# Patient Record
Sex: MALE | Race: WHITE | Employment: OTHER | URBAN - METROPOLITAN AREA
[De-identification: names, ages, dates, MRNs, and addresses within clinical notes are randomized per-mention and may not be internally consistent; named-entity substitution may affect disease eponyms.]

---

## 2022-07-25 ENCOUNTER — OFFICE VISIT (OUTPATIENT)
Dept: UROLOGY | Age: 72
End: 2022-07-25
Payer: MEDICARE

## 2022-07-25 DIAGNOSIS — N32.81 OVERACTIVE BLADDER: ICD-10-CM

## 2022-07-25 DIAGNOSIS — C61 MALIGNANT NEOPLASM PROSTATE (HCC): Primary | ICD-10-CM

## 2022-07-25 LAB
BILIRUBIN, URINE, POC: NEGATIVE
BLOOD URINE, POC: NEGATIVE
GLUCOSE URINE, POC: NEGATIVE
KETONES, URINE, POC: NEGATIVE
LEUKOCYTE ESTERASE, URINE, POC: NEGATIVE
NITRITE, URINE, POC: NEGATIVE
PH, URINE, POC: 5.5 (ref 4.6–8)
PROTEIN,URINE, POC: NEGATIVE
SPECIFIC GRAVITY, URINE, POC: 1.02 (ref 1–1.03)
URINALYSIS CLARITY, POC: NORMAL
URINALYSIS COLOR, POC: NORMAL
UROBILINOGEN, POC: NORMAL

## 2022-07-25 PROCEDURE — 1123F ACP DISCUSS/DSCN MKR DOCD: CPT | Performed by: UROLOGY

## 2022-07-25 PROCEDURE — 99204 OFFICE O/P NEW MOD 45 MIN: CPT | Performed by: UROLOGY

## 2022-07-25 PROCEDURE — 81003 URINALYSIS AUTO W/O SCOPE: CPT | Performed by: UROLOGY

## 2022-07-25 RX ORDER — VIBEGRON 75 MG/1
1 TABLET, FILM COATED ORAL DAILY
Qty: 90 TABLET | Refills: 3 | Status: SHIPPED | OUTPATIENT
Start: 2022-07-25

## 2022-07-25 ASSESSMENT — ENCOUNTER SYMPTOMS
SKIN LESIONS: 0
VOMITING: 0
WHEEZING: 0
BLOOD IN STOOL: 0
BACK PAIN: 0
NAUSEA: 0
COUGH: 0
CONSTIPATION: 0
DIARRHEA: 0
HEARTBURN: 0
EYE DISCHARGE: 0
INDIGESTION: 0
ABDOMINAL PAIN: 0
SHORTNESS OF BREATH: 0
EYE PAIN: 0

## 2022-07-25 NOTE — PROGRESS NOTES
[facility]  [address] [facility]    [address], [address] [facility]  [phone]          Merlinda Garner  : 1950    Chief Complaint   Patient presents with    New Patient    Prostate Cancer          HPI     Merlinda Garner is a 74 y.o. male ([id number]) seen as a new patient in . He has a h/o 4+3=7 ACP s/p EBRT in  in [facility]. He has had issues with urinary frequency/urgency since. Gemtesa 4-7 pills per week helps significantly. He also underwent TURP in . He had signficant bleeding and was in ICU for 2 nights (also [facility]). PSA: 3/19 11.5,  0.24          History reviewed. No pertinent past medical history. History reviewed. No pertinent surgical history. Current Outpatient Medications   Medication Sig Dispense Refill    Vibegron (GEMTESA) 75 MG TABS Take 1 tablet by mouth daily 90 tablet 3     No current facility-administered medications for this visit. Not on File  Social History     Socioeconomic History    Marital status: Single     Spouse name: Not on file    Number of children: Not on file    Years of education: Not on file    Highest education level: Not on file   Occupational History    Not on file   Tobacco Use    Smoking status: Not on file    Smokeless tobacco: Not on file   Substance and Sexual Activity    Alcohol use: Not on file    Drug use: Not on file    Sexual activity: Not on file   Other Topics Concern    Not on file   Social History Narrative    Not on file     Social Determinants of Health     Financial Resource Strain: Not on file   Food Insecurity: Not on file   Transportation Needs: Not on file   Physical Activity: Not on file   Stress: Not on file   Social Connections: Not on file   Intimate Partner Violence: Not on file   Housing Stability: Not on file     History reviewed. No pertinent family history.     Review of Systems  Constitutional:   Negative for fever, chills, appetite change, malaise/fatigue, headaches and weight loss.  Skin:  Negative for skin lesions, rash and itching. Eyes:  Negative for visual disturbance, eye pain and eye discharge. ENT:  Negative for difficulty articulating words, pain swallowing, high frequency hearing loss and dry mouth. Respiratory:  Negative for cough, blood in sputum, shortness of breath and wheezing. Cardiovascular:  Negative for chest pain, hypertension, irregular heartbeat, leg pain, leg swelling, regular rate and rhythm and varicose veins. GI:  Negative for nausea, vomiting, abdominal pain, blood in stool, constipation, diarrhea, indigestion and heartburn. Genitourinary: Positive for nocturia, slower stream, straining, urgency, leakage w/ urge, frequent urination and incomplete emptying. Negative for urinary burning, hematuria, flank pain, recurrent UTIs, history of urolithiasis, erectile dysfunction, testicular pain, sexually transmitted disease, discharge and urethral stricture. Musculoskeletal:  Negative for back pain, bone pain, arthralgias, tenderness, muscle weakness and neck pain. Neurological:  Negative for dizziness, focal weakness, numbness, seizures and tremors. Psychological:  Negative for depression and psychiatric problem. Endocrine:  Negative for cold intolerance, thirst, excessive urination, fatigue and heat intolerance. Hem/Lymphatic:  Negative for easy bleeding, easy bruising and frequent infections.     Urinalysis  UA - Dipstick  Results for orders placed or performed in visit on 07/25/22   AMB POC URINALYSIS DIP STICK AUTO W/O MICRO   Result Value Ref Range    Color (UA POC)      Clarity (UA POC)      Glucose, Urine, POC Negative Negative    Bilirubin, Urine, POC Negative Negative    KETONES, Urine, POC Negative Negative    Specific Gravity, Urine, POC 1.025 1.001 - 1.035    Blood (UA POC) Negative Negative    pH, Urine, POC 5.5 4.6 - 8.0    Protein, Urine, POC Negative Negative    Urobilinogen, POC 0.2 mg/dL     Nitrite, Urine, POC Negative Negative    Leukocyte Esterase, Urine, POC Negative Negative       There were no vitals taken for this visit. GENERAL: NAD, ALERT, A&O x 3, GAIT NORMAL  LUNGS: easy work of breathing  ABDOMEN: soft, non tender  NEUROLOGIC: cranial nerves 2-12 grossly intact           Assessment and Plan    ICD-10-CM    1. Malignant neoplasm prostate (HCC)  C61 AMB POC URINALYSIS DIP STICK AUTO W/O MICRO     PSA, Diagnostic      2. Overactive bladder  N32.81 Vibegron (GEMTESA) 75 MG TABS          In regards to prostate cancer, PSA is being drawn later this week at [facility]. In regards to OAB, he will continue gemtesa and it was refilled. Pt. will follow up in 12 months with psa.

## 2023-07-26 ENCOUNTER — OFFICE VISIT (OUTPATIENT)
Dept: UROLOGY | Age: 73
End: 2023-07-26
Payer: MEDICARE

## 2023-07-26 DIAGNOSIS — N40.1 BPH WITH OBSTRUCTION/LOWER URINARY TRACT SYMPTOMS: ICD-10-CM

## 2023-07-26 DIAGNOSIS — N32.81 OVERACTIVE BLADDER: ICD-10-CM

## 2023-07-26 DIAGNOSIS — C61 MALIGNANT NEOPLASM PROSTATE (HCC): Primary | ICD-10-CM

## 2023-07-26 DIAGNOSIS — N13.8 BPH WITH OBSTRUCTION/LOWER URINARY TRACT SYMPTOMS: ICD-10-CM

## 2023-07-26 LAB
BILIRUBIN, URINE, POC: NEGATIVE
BLOOD URINE, POC: NEGATIVE
GLUCOSE URINE, POC: NEGATIVE
KETONES, URINE, POC: NEGATIVE
LEUKOCYTE ESTERASE, URINE, POC: NEGATIVE
NITRITE, URINE, POC: NEGATIVE
PH, URINE, POC: 6 (ref 4.6–8)
PROTEIN,URINE, POC: NEGATIVE
SPECIFIC GRAVITY, URINE, POC: 1.01 (ref 1–1.03)
URINALYSIS CLARITY, POC: NORMAL
URINALYSIS COLOR, POC: NORMAL
UROBILINOGEN, POC: NORMAL

## 2023-07-26 PROCEDURE — 99214 OFFICE O/P EST MOD 30 MIN: CPT | Performed by: UROLOGY

## 2023-07-26 PROCEDURE — 81003 URINALYSIS AUTO W/O SCOPE: CPT | Performed by: UROLOGY

## 2023-07-26 PROCEDURE — 1123F ACP DISCUSS/DSCN MKR DOCD: CPT | Performed by: UROLOGY

## 2023-07-26 ASSESSMENT — ENCOUNTER SYMPTOMS: BACK PAIN: 0

## 2023-07-26 NOTE — PROGRESS NOTES
history. Review of Systems  Constitutional:   Negative for fever. Genitourinary: Positive for straining and frequent urination. Negative for hematuria. Musculoskeletal:  Negative for back pain. Urinalysis  UA - Dipstick  Results for orders placed or performed in visit on 07/26/23   AMB POC URINALYSIS DIP STICK AUTO W/O MICRO   Result Value Ref Range    Color (UA POC)      Clarity (UA POC)      Glucose, Urine, POC Negative Negative    Bilirubin, Urine, POC Negative Negative    KETONES, Urine, POC Negative Negative    Specific Gravity, Urine, POC 1.010 1.001 - 1.035    Blood (UA POC) Negative Negative    pH, Urine, POC 6.0 4.6 - 8.0    Protein, Urine, POC Negative Negative    Urobilinogen, POC 0.2 mg/dL     Nitrite, Urine, POC Negative Negative    Leukocyte Esterase, Urine, POC Negative Negative       There were no vitals taken for this visit. GENERAL: NAD, ALERT, A&O x 3, GAIT NORMAL  LUNGS: easy work of breathing  ABDOMEN: soft, non tender  NEUROLOGIC: cranial nerves 2-12 grossly intact           Assessment and Plan    ICD-10-CM    1. Malignant neoplasm prostate (HCC)  C61 AMB POC URINALYSIS DIP STICK AUTO W/O MICRO      2. Overactive bladder  N32.81 AMB POC URINALYSIS DIP STICK AUTO W/O MICRO      3. BPH with obstruction/lower urinary tract symptoms  N40.1     N13.8           Due to worsening LUTS, he will return for cystoscopy. We could then make sure there is no BNC and see if something like a urolift might help. He will continue gemtesa. PSA is LOW. I have spent 30 minutes today reviewing previous notes, test results and face to face with the patient as well as documenting.

## 2023-09-20 ENCOUNTER — TELEPHONE (OUTPATIENT)
Dept: UROLOGY | Age: 73
End: 2023-09-20

## 2023-09-21 ENCOUNTER — PROCEDURE VISIT (OUTPATIENT)
Dept: UROLOGY | Age: 73
End: 2023-09-21
Payer: MEDICARE

## 2023-09-21 DIAGNOSIS — N32.81 OVERACTIVE BLADDER: Primary | ICD-10-CM

## 2023-09-21 DIAGNOSIS — N40.1 BPH WITH OBSTRUCTION/LOWER URINARY TRACT SYMPTOMS: ICD-10-CM

## 2023-09-21 DIAGNOSIS — N13.8 BPH WITH OBSTRUCTION/LOWER URINARY TRACT SYMPTOMS: ICD-10-CM

## 2023-09-21 DIAGNOSIS — C61 MALIGNANT NEOPLASM PROSTATE (HCC): ICD-10-CM

## 2023-09-21 LAB
BILIRUBIN, URINE, POC: NEGATIVE
BLOOD URINE, POC: NEGATIVE
GLUCOSE URINE, POC: NEGATIVE
KETONES, URINE, POC: NEGATIVE
LEUKOCYTE ESTERASE, URINE, POC: NEGATIVE
NITRITE, URINE, POC: NEGATIVE
PH, URINE, POC: 7 (ref 4.6–8)
PROTEIN,URINE, POC: NEGATIVE
SPECIFIC GRAVITY, URINE, POC: 1.01 (ref 1–1.03)
URINALYSIS CLARITY, POC: NORMAL
URINALYSIS COLOR, POC: NORMAL
UROBILINOGEN, POC: NORMAL

## 2023-09-21 PROCEDURE — 52000 CYSTOURETHROSCOPY: CPT | Performed by: UROLOGY

## 2023-09-21 PROCEDURE — 81003 URINALYSIS AUTO W/O SCOPE: CPT | Performed by: UROLOGY

## 2023-09-21 RX ORDER — LOSARTAN POTASSIUM 100 MG/1
TABLET ORAL
COMMUNITY
Start: 2023-07-23

## 2023-09-21 RX ORDER — METFORMIN HYDROCHLORIDE 500 MG/1
1000 TABLET, EXTENDED RELEASE ORAL 2 TIMES DAILY
COMMUNITY
Start: 2023-06-29

## 2023-09-21 RX ORDER — AMLODIPINE BESYLATE 5 MG/1
TABLET ORAL
COMMUNITY
Start: 2023-08-16

## 2023-09-21 RX ORDER — RIVAROXABAN 10 MG/1
10 TABLET, FILM COATED ORAL DAILY
COMMUNITY
Start: 2023-08-23

## 2023-09-21 NOTE — PROGRESS NOTES
told him I couldn't guarantee improvement in symptoms, but improvement was likely. He will consider it and let us know. Pt. will follow up in 6 months with psa.

## 2024-04-18 ENCOUNTER — OFFICE VISIT (OUTPATIENT)
Dept: ENT CLINIC | Age: 74
End: 2024-04-18
Payer: MEDICARE

## 2024-04-18 VITALS
HEIGHT: 71 IN | WEIGHT: 196 LBS | BODY MASS INDEX: 27.44 KG/M2 | SYSTOLIC BLOOD PRESSURE: 138 MMHG | DIASTOLIC BLOOD PRESSURE: 78 MMHG

## 2024-04-18 DIAGNOSIS — R04.0 EPISTAXIS: Primary | Chronic | ICD-10-CM

## 2024-04-18 DIAGNOSIS — H93.13 TINNITUS OF BOTH EARS: Chronic | ICD-10-CM

## 2024-04-18 PROCEDURE — 3017F COLORECTAL CA SCREEN DOC REV: CPT | Performed by: PHYSICIAN ASSISTANT

## 2024-04-18 PROCEDURE — 1036F TOBACCO NON-USER: CPT | Performed by: PHYSICIAN ASSISTANT

## 2024-04-18 PROCEDURE — 30901 CONTROL OF NOSEBLEED: CPT | Performed by: PHYSICIAN ASSISTANT

## 2024-04-18 PROCEDURE — G8419 CALC BMI OUT NRM PARAM NOF/U: HCPCS | Performed by: PHYSICIAN ASSISTANT

## 2024-04-18 PROCEDURE — 1123F ACP DISCUSS/DSCN MKR DOCD: CPT | Performed by: PHYSICIAN ASSISTANT

## 2024-04-18 PROCEDURE — 99203 OFFICE O/P NEW LOW 30 MIN: CPT | Performed by: PHYSICIAN ASSISTANT

## 2024-04-18 PROCEDURE — G8427 DOCREV CUR MEDS BY ELIG CLIN: HCPCS | Performed by: PHYSICIAN ASSISTANT

## 2024-04-18 RX ORDER — CARVEDILOL 25 MG/1
25 TABLET ORAL 2 TIMES DAILY
COMMUNITY
Start: 2024-03-29

## 2024-04-18 RX ORDER — EZETIMIBE 10 MG/1
TABLET ORAL
COMMUNITY
Start: 2024-04-05

## 2024-04-18 RX ORDER — LORAZEPAM 1 MG/1
TABLET ORAL
COMMUNITY
Start: 2024-03-20

## 2024-04-18 ASSESSMENT — ENCOUNTER SYMPTOMS
ALLERGIC/IMMUNOLOGIC NEGATIVE: 1
GASTROINTESTINAL NEGATIVE: 1
EYES NEGATIVE: 1
RESPIRATORY NEGATIVE: 1

## 2024-04-18 NOTE — PROGRESS NOTES
Constitutional: Negative.    HENT:  Positive for nosebleeds.    Eyes: Negative.    Respiratory: Negative.     Cardiovascular: Negative.    Gastrointestinal: Negative.    Endocrine: Negative.    Genitourinary: Negative.    Musculoskeletal: Negative.    Skin: Negative.    Allergic/Immunologic: Negative.    Neurological: Negative.    Hematological: Negative.    Psychiatric/Behavioral: Negative.          /78 (Site: Right Upper Arm, Position: Sitting)   Ht 1.803 m (5' 11\")   Wt 88.9 kg (196 lb)   BMI 27.34 kg/m²     Physical Exam:    General: Well developed, well nourished, in no acute distress  Communication: The patient communicates appropriately for their age.  Voice: Normal.  Head, Face, and Salivary Glands: No head or facial abnormalities present, No masses or lesions present, Overall appearance is normal, No abnormality of parotid or submandibular glands present.      External Ears: appearance is normal with no scars, lesions or masses.   Right Ear:  Canals is normal, Tympanic membrane with normal landmarks and normal mobility, no retraction, inflammation, effusion.  Left  Ear: Canal is normal, Tympanic membrane with normal landmarks and normal mobility, no retraction, inflammation, effusion.    Nose/Nasal Cavity: Nasal mucosa is red/ inflamed.  recent bleed on right little's area. Nasal septum is deviated left.  Inferior turbinates are of normal size.  Both nasal passages are clear, no polyps or abnormal drainage.    Lips/Gums/Teeth: Inspection of lips and gums are normal, patient wears dentures  Oral Cavity: normal oral mucosa, no visualized ulcers, masses or other lesions,  Palate normal, Tongue normal, Floor of mouth normal. Mallampati Class 1 .  Oropharynx: Oropharynx normal and unobstructed, tonsils are  + 1  , no lesion or inflammation.     Neck/Thyroid: Normal appearance without mass, Trachea midline, No lymphadenopathy, No enlargement, tenderness or mass of thyroid noted.      Procedure: Rigid

## 2024-09-04 ENCOUNTER — HOSPITAL ENCOUNTER (OUTPATIENT)
Dept: ULTRASOUND IMAGING | Age: 74
Discharge: HOME | End: 2024-09-07
Attending: INTERNAL MEDICINE
Payer: MEDICARE

## 2024-09-04 DIAGNOSIS — Z86.718 PERSONAL HISTORY OF DVT (DEEP VEIN THROMBOSIS): ICD-10-CM

## 2024-09-04 DIAGNOSIS — I82.501 CHRONIC THROMBOEMBOLISM OF DEEP VEINS OF LOWER EXTREMITY, RIGHT (HCC): ICD-10-CM

## 2024-09-04 PROCEDURE — 93971 EXTREMITY STUDY: CPT | Performed by: RADIOLOGY

## 2024-09-04 PROCEDURE — 93971 EXTREMITY STUDY: CPT
